# Patient Record
(demographics unavailable — no encounter records)

---

## 2024-10-08 NOTE — DISCUSSION/SUMMARY
[FreeTextEntry1] : 14 year old male presents to clinic with c/o left mastoid pain ice pack to site while in waiting room acetaminophen 160mg/5ml 20ml dispensed now may repeat dose every 4-6 hours as needed for pain encouraged to ice site if pain persists or worsens, return to clinic for further evaluation

## 2024-10-08 NOTE — HISTORY OF PRESENT ILLNESS
[FreeTextEntry6] : 14 year old male presents to clinic with c/o pain behind left ear states he was playing soccer in big gym when he tripped and fell and hit head on metal soccer goal pole c/o pain 6/10 mild swelling, no bruising or deformity noted

## 2024-10-08 NOTE — PHYSICAL EXAM
[NL] : no acute distress, alert [FreeTextEntry3] : mild swelling noted to left mastoid, tender on palpation